# Patient Record
Sex: FEMALE | Race: WHITE | Employment: UNEMPLOYED | URBAN - METROPOLITAN AREA
[De-identification: names, ages, dates, MRNs, and addresses within clinical notes are randomized per-mention and may not be internally consistent; named-entity substitution may affect disease eponyms.]

---

## 2020-11-19 LAB
ANTIBODY SCREEN OB: NEGATIVE
HEPATITIS B SURFACE ANTIGEN OB: NEGATIVE
HIV RESULT OB: NEGATIVE
RAPID PLASMA REAGIN OB: NONREACTIVE
RH FACTOR OB: POSITIVE

## 2021-03-20 ENCOUNTER — HOSPITAL ENCOUNTER (OUTPATIENT)
Facility: HOSPITAL | Age: 36
Setting detail: OBSERVATION
Discharge: HOME | End: 2021-03-20
Attending: OBSTETRICS & GYNECOLOGY | Admitting: OBSTETRICS & GYNECOLOGY
Payer: COMMERCIAL

## 2021-03-20 VITALS — HEART RATE: 77 BPM | SYSTOLIC BLOOD PRESSURE: 120 MMHG | DIASTOLIC BLOOD PRESSURE: 71 MMHG

## 2021-03-20 PROBLEM — Z34.90 PREGNANCY (HCC): Status: ACTIVE | Noted: 2021-03-20

## 2021-03-20 PROBLEM — Z34.90 PREGNANCY: Status: ACTIVE | Noted: 2021-03-20

## 2021-03-20 PROCEDURE — 36415 COLL VENOUS BLD VENIPUNCTURE: CPT

## 2021-03-20 PROCEDURE — 99203 OFFICE O/P NEW LOW 30 MIN: CPT

## 2021-03-20 RX ORDER — PRENATAL VIT/IRON FUM/FOLIC AC 27MG-0.8MG
1 TABLET ORAL DAILY
COMMUNITY
End: 2021-11-17

## 2021-03-20 NOTE — TRIAGE
[facility]      Triage Note    Salima Hall Patient Status:  Observation    1985 MRN [id number]   Location [address] [facility] Attending Mimi Jay MD   Hosp Day # 0 PCP No primary care provider on file. ronit. exam in office cx closed; no vaginal bleeding. Brigitte Vann RN  3/20/2021 1:25 PM    MD Triage Addendum    Dx: Threatened  Labor  NST: Appropriate for EGA.   No UCs  Disp: Praneeth Mckeon MD  3/24/2021 9:38 PM

## 2021-03-20 NOTE — PROGRESS NOTES
Pt is a 43year old female admitted to TR3/TR3-A. Patient presents with:   Assessment: pt. sent from the office for further evaluation. pt. ronit. exam in office cx closed; no vaginal bleeding.      Pt is  24w3d intra-uterine pregna

## 2021-06-08 ENCOUNTER — HOSPITAL ENCOUNTER (INPATIENT)
Facility: HOSPITAL | Age: 36
LOS: 2 days | Discharge: HOME | End: 2021-06-10
Attending: OBSTETRICS & GYNECOLOGY | Admitting: OBSTETRICS & GYNECOLOGY
Payer: COMMERCIAL

## 2021-06-08 PROBLEM — Z34.90 PREGNANT: Status: ACTIVE | Noted: 2021-06-08

## 2021-06-08 PROBLEM — Z34.90 PREGNANT (HCC): Status: ACTIVE | Noted: 2021-06-08

## 2021-06-08 PROCEDURE — 88307 TISSUE EXAM BY PATHOLOGIST: CPT | Performed by: OBSTETRICS & GYNECOLOGY

## 2021-06-08 PROCEDURE — 86780 TREPONEMA PALLIDUM: CPT | Performed by: OBSTETRICS & GYNECOLOGY

## 2021-06-08 PROCEDURE — 85025 COMPLETE CBC W/AUTO DIFF WBC: CPT | Performed by: OBSTETRICS & GYNECOLOGY

## 2021-06-08 PROCEDURE — 86900 BLOOD TYPING SEROLOGIC ABO: CPT | Performed by: OBSTETRICS & GYNECOLOGY

## 2021-06-08 PROCEDURE — 86901 BLOOD TYPING SEROLOGIC RH(D): CPT | Performed by: OBSTETRICS & GYNECOLOGY

## 2021-06-08 PROCEDURE — 86701 HIV-1ANTIBODY: CPT | Performed by: OBSTETRICS & GYNECOLOGY

## 2021-06-08 PROCEDURE — 0KQM0ZZ REPAIR PERINEUM MUSCLE, OPEN APPROACH: ICD-10-PCS | Performed by: OBSTETRICS & GYNECOLOGY

## 2021-06-08 PROCEDURE — 86850 RBC ANTIBODY SCREEN: CPT | Performed by: OBSTETRICS & GYNECOLOGY

## 2021-06-08 RX ORDER — TERBUTALINE SULFATE 1 MG/ML
0.25 INJECTION, SOLUTION SUBCUTANEOUS AS NEEDED
Status: DISCONTINUED | OUTPATIENT
Start: 2021-06-08 | End: 2021-06-08

## 2021-06-08 RX ORDER — SODIUM CHLORIDE, SODIUM LACTATE, POTASSIUM CHLORIDE, CALCIUM CHLORIDE 600; 310; 30; 20 MG/100ML; MG/100ML; MG/100ML; MG/100ML
INJECTION, SOLUTION INTRAVENOUS CONTINUOUS
Status: DISCONTINUED | OUTPATIENT
Start: 2021-06-08 | End: 2021-06-08

## 2021-06-08 RX ORDER — ACETAMINOPHEN 325 MG/1
650 TABLET ORAL EVERY 6 HOURS PRN
Status: DISCONTINUED | OUTPATIENT
Start: 2021-06-08 | End: 2021-06-10

## 2021-06-08 RX ORDER — AMMONIA INHALANTS 0.04 G/.3ML
0.3 INHALANT RESPIRATORY (INHALATION) AS NEEDED
Status: DISCONTINUED | OUTPATIENT
Start: 2021-06-08 | End: 2021-06-08

## 2021-06-08 RX ORDER — TRISODIUM CITRATE DIHYDRATE AND CITRIC ACID MONOHYDRATE 500; 334 MG/5ML; MG/5ML
30 SOLUTION ORAL AS NEEDED
Status: DISCONTINUED | OUTPATIENT
Start: 2021-06-08 | End: 2021-06-08

## 2021-06-08 RX ORDER — DEXTROSE, SODIUM CHLORIDE, SODIUM LACTATE, POTASSIUM CHLORIDE, AND CALCIUM CHLORIDE 5; .6; .31; .03; .02 G/100ML; G/100ML; G/100ML; G/100ML; G/100ML
INJECTION, SOLUTION INTRAVENOUS CONTINUOUS
Status: DISCONTINUED | OUTPATIENT
Start: 2021-06-08 | End: 2021-06-08

## 2021-06-08 RX ORDER — IBUPROFEN 600 MG/1
600 TABLET ORAL EVERY 6 HOURS PRN
Status: DISCONTINUED | OUTPATIENT
Start: 2021-06-08 | End: 2021-06-10

## 2021-06-08 RX ORDER — ONDANSETRON 2 MG/ML
4 INJECTION INTRAMUSCULAR; INTRAVENOUS EVERY 6 HOURS PRN
Status: DISCONTINUED | OUTPATIENT
Start: 2021-06-08 | End: 2021-06-08

## 2021-06-08 RX ORDER — IBUPROFEN 600 MG/1
600 TABLET ORAL EVERY 6 HOURS PRN
Status: DISCONTINUED | OUTPATIENT
Start: 2021-06-08 | End: 2021-06-08

## 2021-06-08 RX ORDER — AMMONIA INHALANTS 0.04 G/.3ML
0.3 INHALANT RESPIRATORY (INHALATION) AS NEEDED
Status: DISCONTINUED | OUTPATIENT
Start: 2021-06-08 | End: 2021-06-10

## 2021-06-08 RX ORDER — DIAPER,BRIEF,INFANT-TODD,DISP
1 EACH MISCELLANEOUS EVERY 6 HOURS PRN
Status: DISCONTINUED | OUTPATIENT
Start: 2021-06-08 | End: 2021-06-10

## 2021-06-08 RX ORDER — LIDOCAINE HYDROCHLORIDE 10 MG/ML
30 INJECTION, SOLUTION EPIDURAL; INFILTRATION; INTRACAUDAL; PERINEURAL ONCE
Status: DISCONTINUED | OUTPATIENT
Start: 2021-06-08 | End: 2021-06-08

## 2021-06-08 RX ORDER — ONDANSETRON 2 MG/ML
4 INJECTION INTRAMUSCULAR; INTRAVENOUS EVERY 6 HOURS PRN
Status: DISCONTINUED | OUTPATIENT
Start: 2021-06-08 | End: 2021-06-10

## 2021-06-08 RX ORDER — ACETAMINOPHEN 500 MG
500 TABLET ORAL EVERY 6 HOURS PRN
Status: DISCONTINUED | OUTPATIENT
Start: 2021-06-08 | End: 2021-06-08

## 2021-06-08 RX ORDER — BISACODYL 10 MG
10 SUPPOSITORY, RECTAL RECTAL ONCE AS NEEDED
Status: DISCONTINUED | OUTPATIENT
Start: 2021-06-08 | End: 2021-06-10

## 2021-06-08 RX ORDER — SIMETHICONE 80 MG
80 TABLET,CHEWABLE ORAL 3 TIMES DAILY PRN
Status: DISCONTINUED | OUTPATIENT
Start: 2021-06-08 | End: 2021-06-10

## 2021-06-08 RX ORDER — DOCUSATE SODIUM 100 MG/1
100 CAPSULE, LIQUID FILLED ORAL 2 TIMES DAILY
Status: DISCONTINUED | OUTPATIENT
Start: 2021-06-08 | End: 2021-06-10

## 2021-06-08 NOTE — L&D DELIVERY NOTE
Netta Jose  [id number]  2021  5:51 AM      Delivery Note    Type of delivery:   Fetus:  · Position: OA  · Sex: Male  · Weight: 8lb 3oz  · Apgars: 8/9  Anesthesia: none  Episiotomy: No  Laceration:   · Location: 2nd degree midline perineal  · Rep

## 2021-06-08 NOTE — PROGRESS NOTES
Report given to [facility]. Patient still visiting  in [facility] at this time.

## 2021-06-08 NOTE — H&P
[facility]  [id number]  2021  5:47 AM    LABOR & DELIVERY H&P    The patient is a 43year old  at 35w6d based on LMP and 7wk U/S presenting with PPROM and labor.     Her pregnancy has been complicated by:  · AMA - CFDNA WNL  · Borderline poly

## 2021-06-08 NOTE — LACTATION NOTE
This note was copied from a baby's chart.   LACTATION NOTE - INFANT    Evaluation Type  Evaluation Type: Inpatient    Problems & Assessment  Problems Diagnosed or Identified: Sleepy  Infant Assessment: Anterior fontanel soft and flat;Skin color: pink or kiran

## 2021-06-08 NOTE — PROGRESS NOTES
Pt is a 43year old female admitted to LDR3/LDR3-A. Patient presents with:  Rupture Of Membranes: SROM 0315. (+) fetal movement, Denies bleeding     Pt is [id number] 35w6d intra-uterine pregnancy. History obtained, consents signed.  Oriented to room, staff,

## 2021-06-08 NOTE — DISCHARGE SUMMARY
[facility]    Discharge Summary    Dora Hayes Patient Status:  Inpatient    1985 MRN [id number]   Location [address]  Attending Page Rosario MD   [facility] Day # 0       Admission Date: 2021  Disch

## 2021-06-09 PROCEDURE — 85014 HEMATOCRIT: CPT | Performed by: OBSTETRICS & GYNECOLOGY

## 2021-06-09 PROCEDURE — 85018 HEMOGLOBIN: CPT | Performed by: OBSTETRICS & GYNECOLOGY

## 2021-06-09 NOTE — LACTATION NOTE
This note was copied from a baby's chart.   LACTATION NOTE - INFANT    Evaluation Type  Evaluation Type: Inpatient    Problems & Assessment  Problems Diagnosed or Identified: Premature  Problems: comment/detail: 35 weeks  Infant Assessment: Hunger cues pres

## 2021-06-09 NOTE — LACTATION NOTE
This note was copied from a baby's chart.   LACTATION NOTE - INFANT    Evaluation Type  Evaluation Type: Inpatient    Problems & Assessment  Problems Diagnosed or Identified: Premature  Problems: comment/detail: 35 weeks  Infant Assessment: Anterior fontane

## 2021-06-09 NOTE — PLAN OF CARE
Problem: BIRTH - VAGINAL/ SECTION  Goal: Fetal and maternal status remain reassuring during the birth process  Description: INTERVENTIONS:  - Monitor vital signs  - Monitor fetal heart rate  - Monitor uterine activity  - Monitor labor progression ambulation and provide assistance as needed. - Assess and monitor emotional status and provide social service/psych resources as needed. - Utilize standard precautions and use personal protective equipment as indicated.  Ensure aseptic care of all intrave supply with medication/procedure interruptions  Description: INTERVENTIONS:  - Review techniques for milk expression (breast pumping). - Provide pumping equipment/supplies, instructions, and assistance until it is safe to breastfeed infant.   Outcome: Pro

## 2021-06-09 NOTE — PROGRESS NOTES
Post-Partum Note   6/9/2021, 10:43 AM    Subjective:  PPD #1  No complaints. Lochia normal. Voiding normal. Not dizzy. Mood good.        Objective:   06/08/21  1300 06/08/21  1700 06/08/21 2058 06/09/21  0800   BP: 114/64 115/68 115/63 113/66   BP Location

## 2021-06-09 NOTE — LACTATION NOTE
LACTATION NOTE - MOTHER      Evaluation Type: Inpatient    Problems identified  Problems identified: Knowledge deficit    Maternal history  Maternal history: AMA  Other/comment:  labor    Breastfeeding goal  Breastfeeding goal: To maintain breast mi

## 2021-06-10 VITALS
BODY MASS INDEX: 29.08 KG/M2 | RESPIRATION RATE: 16 BRPM | DIASTOLIC BLOOD PRESSURE: 74 MMHG | SYSTOLIC BLOOD PRESSURE: 117 MMHG | HEART RATE: 86 BPM | WEIGHT: 158 LBS | TEMPERATURE: 98 F | OXYGEN SATURATION: 99 % | HEIGHT: 62 IN

## 2021-06-10 PROBLEM — Z34.90 PREGNANCY: Status: RESOLVED | Noted: 2021-03-20 | Resolved: 2021-06-10

## 2021-06-10 PROBLEM — Z34.90 PREGNANCY (HCC): Status: RESOLVED | Noted: 2021-03-20 | Resolved: 2021-06-10

## 2021-06-10 PROBLEM — N83.292 COMPLEX CYST OF LEFT OVARY: Status: ACTIVE | Noted: 2021-06-10

## 2021-06-10 PROBLEM — Z34.90 PREGNANT (HCC): Status: RESOLVED | Noted: 2021-06-08 | Resolved: 2021-06-10

## 2021-06-10 PROBLEM — Z86.32 HISTORY OF GESTATIONAL DIABETES: Chronic | Status: ACTIVE | Noted: 2021-06-10

## 2021-06-10 PROBLEM — Z34.90 PREGNANT: Status: RESOLVED | Noted: 2021-06-08 | Resolved: 2021-06-10

## 2021-06-10 NOTE — PROGRESS NOTES
Discharge order received from MD.    Discharge instructions and medications reviewed with patient. Follow up instructions with OB given. Mother verbalizes understanding of instructions. Discharged in stable condition to [facility].

## 2021-06-17 ENCOUNTER — TELEPHONE (OUTPATIENT)
Dept: OBGYN UNIT | Facility: HOSPITAL | Age: 36
End: 2021-06-17

## 2021-11-15 NOTE — H&P
[facility]    PATIENT'S NAME: Ernesto Kiki   ATTENDING PHYSICIAN: Chip Barrios MD   PATIENT ACCOUNT#:   [de-identified]    LOCATION:    MEDICAL RECORD #:   [id number]       YOB: 1985  ADMISSION DATE:       11/19/2021    HISTORY G4, P3, with a left ovarian dermoid cyst, who presents today for a laparoscopic left ovarian cystectomy. She has been counseled as to the risks, benefits, and alternatives to the procedure, and desires to proceed today.      Dictated By Harry Soulier

## 2021-11-16 ENCOUNTER — LAB ENCOUNTER (OUTPATIENT)
Dept: LAB | Facility: HOSPITAL | Age: 36
End: 2021-11-16
Attending: OBSTETRICS & GYNECOLOGY
Payer: COMMERCIAL

## 2021-11-16 DIAGNOSIS — Z01.818 PREOP TESTING: ICD-10-CM

## 2021-11-19 ENCOUNTER — ANESTHESIA EVENT (OUTPATIENT)
Dept: SURGERY | Facility: HOSPITAL | Age: 36
End: 2021-11-19
Payer: COMMERCIAL

## 2021-11-19 ENCOUNTER — ANESTHESIA (OUTPATIENT)
Dept: SURGERY | Facility: HOSPITAL | Age: 36
End: 2021-11-19
Payer: COMMERCIAL

## 2021-11-19 ENCOUNTER — HOSPITAL ENCOUNTER (OUTPATIENT)
Facility: HOSPITAL | Age: 36
Setting detail: HOSPITAL OUTPATIENT SURGERY
Discharge: HOME | End: 2021-11-19
Attending: OBSTETRICS & GYNECOLOGY | Admitting: OBSTETRICS & GYNECOLOGY
Payer: COMMERCIAL

## 2021-11-19 VITALS
HEIGHT: 62 IN | SYSTOLIC BLOOD PRESSURE: 101 MMHG | WEIGHT: 128 LBS | HEART RATE: 68 BPM | BODY MASS INDEX: 23.55 KG/M2 | OXYGEN SATURATION: 99 % | RESPIRATION RATE: 14 BRPM | DIASTOLIC BLOOD PRESSURE: 46 MMHG | TEMPERATURE: 97 F

## 2021-11-19 DIAGNOSIS — Z01.818 PREOP TESTING: Primary | ICD-10-CM

## 2021-11-19 PROBLEM — Z86.32 HISTORY OF GESTATIONAL DIABETES: Chronic | Status: RESOLVED | Noted: 2021-06-10 | Resolved: 2021-11-19

## 2021-11-19 PROCEDURE — 88307 TISSUE EXAM BY PATHOLOGIST: CPT | Performed by: OBSTETRICS & GYNECOLOGY

## 2021-11-19 PROCEDURE — 88305 TISSUE EXAM BY PATHOLOGIST: CPT | Performed by: OBSTETRICS & GYNECOLOGY

## 2021-11-19 PROCEDURE — 81025 URINE PREGNANCY TEST: CPT

## 2021-11-19 PROCEDURE — 0UB14ZZ EXCISION OF LEFT OVARY, PERCUTANEOUS ENDOSCOPIC APPROACH: ICD-10-PCS | Performed by: OBSTETRICS & GYNECOLOGY

## 2021-11-19 RX ORDER — HYDROCODONE BITARTRATE AND ACETAMINOPHEN 5; 325 MG/1; MG/1
2 TABLET ORAL AS NEEDED
Status: DISCONTINUED | OUTPATIENT
Start: 2021-11-19 | End: 2021-11-19

## 2021-11-19 RX ORDER — DIPHENHYDRAMINE HYDROCHLORIDE 50 MG/ML
12.5 INJECTION INTRAMUSCULAR; INTRAVENOUS EVERY 4 HOURS PRN
Status: DISCONTINUED | OUTPATIENT
Start: 2021-11-19 | End: 2021-11-19

## 2021-11-19 RX ORDER — PROCHLORPERAZINE EDISYLATE 5 MG/ML
5 INJECTION INTRAMUSCULAR; INTRAVENOUS ONCE AS NEEDED
Status: DISCONTINUED | OUTPATIENT
Start: 2021-11-19 | End: 2021-11-19

## 2021-11-19 RX ORDER — IBUPROFEN 400 MG/1
400 TABLET ORAL EVERY 6 HOURS PRN
Status: DISCONTINUED | OUTPATIENT
Start: 2021-11-19 | End: 2021-11-19

## 2021-11-19 RX ORDER — ONDANSETRON 2 MG/ML
4 INJECTION INTRAMUSCULAR; INTRAVENOUS EVERY 8 HOURS PRN
Status: DISCONTINUED | OUTPATIENT
Start: 2021-11-19 | End: 2021-11-19

## 2021-11-19 RX ORDER — ROCURONIUM BROMIDE 10 MG/ML
INJECTION, SOLUTION INTRAVENOUS AS NEEDED
Status: DISCONTINUED | OUTPATIENT
Start: 2021-11-19 | End: 2021-11-19 | Stop reason: SURG

## 2021-11-19 RX ORDER — IBUPROFEN 600 MG/1
600 TABLET ORAL EVERY 6 HOURS PRN
Status: DISCONTINUED | OUTPATIENT
Start: 2021-11-19 | End: 2021-11-19

## 2021-11-19 RX ORDER — NALOXONE HYDROCHLORIDE 0.4 MG/ML
80 INJECTION, SOLUTION INTRAMUSCULAR; INTRAVENOUS; SUBCUTANEOUS AS NEEDED
Status: DISCONTINUED | OUTPATIENT
Start: 2021-11-19 | End: 2021-11-19

## 2021-11-19 RX ORDER — SODIUM CHLORIDE, SODIUM LACTATE, POTASSIUM CHLORIDE, CALCIUM CHLORIDE 600; 310; 30; 20 MG/100ML; MG/100ML; MG/100ML; MG/100ML
INJECTION, SOLUTION INTRAVENOUS CONTINUOUS
Status: DISCONTINUED | OUTPATIENT
Start: 2021-11-19 | End: 2021-11-19

## 2021-11-19 RX ORDER — ONDANSETRON 2 MG/ML
4 INJECTION INTRAMUSCULAR; INTRAVENOUS ONCE AS NEEDED
Status: DISCONTINUED | OUTPATIENT
Start: 2021-11-19 | End: 2021-11-19

## 2021-11-19 RX ORDER — HYDROCODONE BITARTRATE AND ACETAMINOPHEN 5; 325 MG/1; MG/1
1 TABLET ORAL AS NEEDED
Status: DISCONTINUED | OUTPATIENT
Start: 2021-11-19 | End: 2021-11-19

## 2021-11-19 RX ORDER — ACETAMINOPHEN 500 MG
1000 TABLET ORAL ONCE
Status: COMPLETED | OUTPATIENT
Start: 2021-11-19 | End: 2021-11-19

## 2021-11-19 RX ORDER — BUPIVACAINE HYDROCHLORIDE 2.5 MG/ML
INJECTION, SOLUTION EPIDURAL; INFILTRATION; INTRACAUDAL AS NEEDED
Status: DISCONTINUED | OUTPATIENT
Start: 2021-11-19 | End: 2021-11-19 | Stop reason: HOSPADM

## 2021-11-19 RX ORDER — GLYCOPYRROLATE 0.2 MG/ML
INJECTION, SOLUTION INTRAMUSCULAR; INTRAVENOUS AS NEEDED
Status: DISCONTINUED | OUTPATIENT
Start: 2021-11-19 | End: 2021-11-19 | Stop reason: SURG

## 2021-11-19 RX ORDER — DEXAMETHASONE SODIUM PHOSPHATE 4 MG/ML
VIAL (ML) INJECTION AS NEEDED
Status: DISCONTINUED | OUTPATIENT
Start: 2021-11-19 | End: 2021-11-19 | Stop reason: SURG

## 2021-11-19 RX ORDER — MIDAZOLAM HYDROCHLORIDE 1 MG/ML
INJECTION INTRAMUSCULAR; INTRAVENOUS AS NEEDED
Status: DISCONTINUED | OUTPATIENT
Start: 2021-11-19 | End: 2021-11-19 | Stop reason: SURG

## 2021-11-19 RX ORDER — ONDANSETRON 4 MG/1
4 TABLET, FILM COATED ORAL EVERY 8 HOURS PRN
Status: DISCONTINUED | OUTPATIENT
Start: 2021-11-19 | End: 2021-11-19

## 2021-11-19 RX ORDER — IBUPROFEN 200 MG
200 TABLET ORAL EVERY 6 HOURS PRN
Status: DISCONTINUED | OUTPATIENT
Start: 2021-11-19 | End: 2021-11-19

## 2021-11-19 RX ADMIN — MIDAZOLAM HYDROCHLORIDE 2 MG: 1 INJECTION INTRAMUSCULAR; INTRAVENOUS at 07:29:00

## 2021-11-19 RX ADMIN — ROCURONIUM BROMIDE 10 MG: 10 INJECTION, SOLUTION INTRAVENOUS at 07:34:00

## 2021-11-19 RX ADMIN — GLYCOPYRROLATE 0.2 MG: 0.2 INJECTION, SOLUTION INTRAMUSCULAR; INTRAVENOUS at 08:12:00

## 2021-11-19 RX ADMIN — DEXAMETHASONE SODIUM PHOSPHATE 4 MG: 4 MG/ML VIAL (ML) INJECTION at 07:37:00

## 2021-11-19 NOTE — DISCHARGE SUMMARY
[facility] HOSP - [facility]    Discharge Summary    Jose Rebolledo Patient Status:  Hospital Outpatient Surgery    1985 MRN [id number]   Location [place] [address] Attending Doron Luevano MD   Hosp Day # 0 PCP No primary care prov

## 2021-11-19 NOTE — ANESTHESIA PREPROCEDURE EVALUATION
Anesthesia PreOp Note    HPI:     Karina Aguila is a 43year old female who presents for preoperative consultation requested by: Jennifer Reaves MD    Date of Surgery: 11/19/2021    Procedure(s):  laparoscopic left ovarian cystectomy, possible left sal Health  Financial Resource Strain: Not on file  Food Insecurity: Not on file  Transportation Needs: Not on file  Physical Activity: Not on file  Stress: Not on file  Social Connections: Not on file  Intimate Partner Violence: Not on file  Housing Stability

## 2021-11-19 NOTE — BRIEF OP NOTE
Pre-Operative Diagnosis: left ovarian dermoid     Post-Operative Diagnosis: left ovarian dermoid      Procedure Performed:   laparoscopic left ovarian cystectomy    Surgeon(s) and Role:     * Erik Correa MD - Primary     * Dianelys Salvador MD - Jana Ledesma no

## 2021-11-19 NOTE — INTERVAL H&P NOTE
Pre-op Diagnosis: left ovarian dermoid    The above referenced H&P was reviewed by Nasima Hubbard MD on 11/19/2021, the patient was examined and no significant changes have occurred in the patient's condition since the H&P was performed.   I discussed w

## 2021-11-19 NOTE — ANESTHESIA POSTPROCEDURE EVALUATION
Patient: Little Gutierrez    Procedure Summary     Date: 11/19/21 Room / Location: [address] [place] [address] MAIN OR 03 / [address] [facility] MAIN OR    Anesthesia Start: 3577 Anesthesia Stop: 7160    Procedure: laparoscopic left ovarian cystectomy (Left Abdomen) Diagnosis: (left ovarian dermoid)

## 2021-11-19 NOTE — INTERVAL H&P NOTE
Pre-op Diagnosis: left ovarian dermoid    The above referenced H&P was reviewed by Yanni Theodore MD on 11/19/2021, the patient was examined and no significant changes have occurred in the patient's condition since the H&P was performed.   I discussed w

## 2021-11-19 NOTE — ANESTHESIA PROCEDURE NOTES
Airway  Date/Time: 11/19/2021 7:34 AM  Urgency: Elective    Airway not difficult    General Information and Staff    Patient location during procedure: OR  Anesthesiologist: Ramakrishna Hernandez MD  Performed: anesthesiologist     Indications and Patient Conditi

## 2021-11-22 NOTE — OPERATIVE REPORT
[facility]    PATIENT'S NAME: Ernesto Kiki   ATTENDING PHYSICIAN: Chip Barrios MD   OPERATING PHYSICIAN: Chip Barrios MD   PATIENT ACCOUNT#:   [de-identified]    LOCATION:  [place] [address]  MEDICAL RECORD #:   [id number] with the use of various blunt dissectors. The 10 mm scope was then swapped for a 5 mm, such that an EndoCatch bag could be inserted and the ovarian cyst removed that way.   Replacement of the 10 mm scope after removal of the cyst revealed good hemostasis i

## 2025-04-15 ENCOUNTER — OFFICE VISIT (OUTPATIENT)
Dept: OBGYN CLINIC | Facility: CLINIC | Age: 40
End: 2025-04-15
Payer: COMMERCIAL

## 2025-04-15 VITALS
WEIGHT: 126.63 LBS | BODY MASS INDEX: 23.3 KG/M2 | DIASTOLIC BLOOD PRESSURE: 74 MMHG | HEIGHT: 62 IN | SYSTOLIC BLOOD PRESSURE: 122 MMHG

## 2025-04-15 DIAGNOSIS — Z12.4 SCREENING FOR CERVICAL CANCER: ICD-10-CM

## 2025-04-15 DIAGNOSIS — Z01.419 ENCOUNTER FOR ANNUAL ROUTINE GYNECOLOGICAL EXAMINATION: Primary | ICD-10-CM

## 2025-04-15 PROCEDURE — 3078F DIAST BP <80 MM HG: CPT | Performed by: OBSTETRICS & GYNECOLOGY

## 2025-04-15 PROCEDURE — 99386 PREV VISIT NEW AGE 40-64: CPT | Performed by: OBSTETRICS & GYNECOLOGY

## 2025-04-15 PROCEDURE — 87624 HPV HI-RISK TYP POOLED RSLT: CPT | Performed by: OBSTETRICS & GYNECOLOGY

## 2025-04-15 PROCEDURE — 3074F SYST BP LT 130 MM HG: CPT | Performed by: OBSTETRICS & GYNECOLOGY

## 2025-04-15 PROCEDURE — 3008F BODY MASS INDEX DOCD: CPT | Performed by: OBSTETRICS & GYNECOLOGY

## 2025-04-15 NOTE — PROGRESS NOTES
ANNUAL GYN EXAM  [facility]    CHIEF COMPLAINT:    Chief Complaint   Patient presents with    Annual      HISTORY OF PRESENT ILLNESS:   Amla Waterman is a 40 year old female   who presents for annual well woman visit.  She is feeling well without complaints.      Has had a couple days of spotting before menses starts, on/off in last 6 months.   with vasectomy     PAST GYNECOLOGICAL HISTORY & OTHER PREVENTIVE MEDICINE  LMP: Patient's last menstrual period was 2025.  Menarche: 10 years old (4/15/2025  9:32 AM)  Period Cycle (Days): monthly (4/15/2025  9:32 AM)  Period Duration (Days): 5 days (4/15/2025  9:32 AM)  Period Flow: heavy (4/15/2025  9:32 AM)  Use of Birth Control (if yes, specify type): None (4/15/2025  9:32 AM)  Pap Date: 20 (4/15/2025  9:32 AM)  Pap Result Notes: neg (4/15/2025  9:32 AM)    Complications: per HPI  Gravita/Parity:   Contraception: current -vasectomy; Previous - oral contraceptive pill  Sexually transmitted disease history:None  Number of sexual partners: current sexual partners: 1, since age 20, Lifetime partners:   Pap history:  Last pap/result: NIL ; history abnormals: denies  Date of last mammogram: none yet -; history abnormals   Abuse history: denies  Vaginal discharge: denies  Bladder symptoms: denies    PAST MEDICAL HISTORY:   Past Medical History[1]     PAST SURGICAL HISTORY:   Past Surgical History[2]     PAST OB HISTORY:  OB History    Para Term  AB Living   4 3 2 1 1 3   SAB IAB Ectopic Multiple Live Births   1   0 3      # Outcome Date GA Lbr Wes/2nd Weight Sex Type Anes PTL Lv   4  21 35w6d 02:05 / 00:11 8 lb 3 oz (3.715 kg) M VAGINAL DENY None Y JENELLE      Complications: Other - see comments, Precipitous labor,  labor   3 Term 18 39w5d  8 lb 14 oz (4.026 kg) F NORMAL SPONT   JENELLE   2 Term 14 39w0d  8 lb 7 oz (3.827 kg) M Caesarean  N JENELLE   1 SAB 12    U    FD       CURRENT MEDICATIONS:     Medications - Current[3]    ALLERGIES:  Allergies[4]    SOCIAL HISTORY:  Social Hx on file[5]    FAMILY HISTORY:  Family History[6]  ASSESSMENTS:  REVIEW OF SYSTEMS:  CONSTITUTIONAL:  negative for fevers, chills and sweats    EYES:  negative for  blurred vision and visual disturbance  RESPIRATORY:  negative for  cough and shortness of breath  CARDIOVASCULAR:  negative for  chest pain, palpitations  GASTROINTESTINAL:  No constipation/diarrhea, no pain  GENITOURINARY:  See History of Present Illness  INTEGUMENT/BREAST: Breast: no masses, no nipple discharge  ENDOCRINE:  negative for acne, constipation, diarrhea, cold intolerance, heat intolerance, fatigue, hair loss, weight gain and weight loss  MUSCULOSKELETAL:  negative for joint pain  NEUROLOGICAL:  negative for dizziness/lightheadedness and headaches  BEHAVIOR/PSYCH:  Negative for depressed mood, anhedonia and anxiety    PHYSICAL EXAM  Patient's last menstrual period was 04/04/2025.   Vitals:    04/15/25 0932   BP: 122/74   Weight: 126 lb 9.6 oz (57.4 kg)   Height: 62\"       CONSTITUTIONAL: Awake, alert, cooperative, no apparent distress, and appears stated age   NECK:  symmetrical, trachea midline, no adenopathy, thyroid symmetric, not enlarged   LUNGS: respiration unlabored  CARDIOVASCULAR: no peripheral edema or varicosities, skin warm and dry  ABDOMEN: Soft, non-distended, non-tender, no masses palpated    CHEST/BREASTS: Breasts symmetrical, skin without lesion(s), no nipple retraction or dimpling, no nipple discharge, no masses palpated, no axillary or supraclavicular adenopathy  GENITAL/URINARY:    External Genitalia:  General appearance; normal, Hair distribution; normal, Lesions absent   Urethral Meatus:  Lesions absent, Prolapse absent  Bladder:  Tenderness absent, Cystocele absent  Vagina:  Discharge absent, Lesions absent, Pelvic support normal  Cervix:  Lesions absent, Discharge absent, Tenderness absent  Uterus:  Size normal, Masses absent,  Tenderness absent  Adnexa:  Masses absent, Tenderness absent  Anus/Perineum:  Lesions absent    MUSCULOSKELETAL: There is no redness, warmth, or swelling of the joints.  Full range of motion noted.  Motor strength is 5 out of 5 all extremities bilaterally.  Tone is normal.  NEUROLOGIC: Patient is awake, alert and oriented to name, place and time.  Casual gait is normal.  SKIN: no bruising or bleeding and no rashes  PSYCHIATRIC: Behavior:  Appropriate  Mood:  appropriate  ASSESSMENT AND PLAN:  1. Encounter for annual routine gynecological examination  Observe menses for now. If worsening, consider ultrasound, endometrial biopsy.    - [facility] 2D+3D SCREENING BILAT (CPT=77067/78944); Future    2. Screening for cervical cancer    - ThinPrep PAP Smear; Future  - Hpv Dna  High Risk , Thin Prep Collect; Future  - ThinPrep PAP Smear  - Hpv Dna  High Risk , Thin Prep Collect       Preventive Medicine in a 40 year old female  Health Maintenance Topics with due status: Overdue       Topic Date Due    Annual Physical Never done    Mammogram Never done    DTaP,Tdap,and Td Vaccines Never done    Pap Smear Never done    COVID-19 Vaccine 2024    Annual Depression Screening Never done       COUNSELING/EDUCATION PERFORMED:   Contraception.  Form chosen:  vasectomy  method use review  emergency contraception  Cervical Cancer Screening  Breast Cancer Screening - monthly self breast exam   Appropriate diet  Exercise  Safe sex/STD transmission/use of condoms  follow up 1 yr or as needed  Dasha Fields MD         [1] History reviewed. No pertinent past medical history.  [2]   Past Surgical History:  Procedure Laterality Date          Cyst removal      Dilation/curettage,diagnostic     [3] No current outpatient medications on file.  [4] No Known Allergies  [5]   Social History  Socioeconomic History    Marital status:    Tobacco Use    Smoking status: Never    Smokeless tobacco: Never   Vaping Use    Vaping  status: Never Used   Substance and Sexual Activity    Alcohol use: Yes    Drug use: Never    Sexual activity: Yes     Birth control/protection: Vasectomy   Other Topics Concern    Blood Transfusions No   [6]   Family History  Problem Relation Age of Onset    Dementia Father     Lipids Mother     Breast Cancer Neg     Uterine Cancer Neg     Ovarian Cancer Neg

## 2025-04-16 LAB — HPV E6+E7 MRNA CVX QL NAA+PROBE: NEGATIVE

## 2025-04-21 LAB
LAST PAP RESULT: NORMAL
PAP HISTORY (OTHER THAN LAST PAP): NORMAL

## 2025-05-02 ENCOUNTER — HOSPITAL ENCOUNTER (OUTPATIENT)
Dept: MAMMOGRAPHY | Age: 40
Discharge: HOME | End: 2025-05-02
Attending: OBSTETRICS & GYNECOLOGY
Payer: COMMERCIAL

## 2025-05-02 DIAGNOSIS — Z01.419 ENCOUNTER FOR ANNUAL ROUTINE GYNECOLOGICAL EXAMINATION: ICD-10-CM

## 2025-05-02 PROCEDURE — 77063 BREAST TOMOSYNTHESIS BI: CPT | Performed by: OBSTETRICS & GYNECOLOGY

## 2025-05-02 PROCEDURE — 77067 SCR MAMMO BI INCL CAD: CPT | Performed by: OBSTETRICS & GYNECOLOGY

## 2025-06-02 ENCOUNTER — OFFICE VISIT (OUTPATIENT)
Dept: DERMATOLOGY CLINIC | Facility: CLINIC | Age: 40
End: 2025-06-02
Payer: COMMERCIAL

## 2025-06-02 DIAGNOSIS — L81.4 LENTIGO: ICD-10-CM

## 2025-06-02 DIAGNOSIS — D22.9 MULTIPLE NEVI: Primary | ICD-10-CM

## 2025-06-02 DIAGNOSIS — Z12.83 SKIN CANCER SCREENING: ICD-10-CM

## 2025-06-02 DIAGNOSIS — D23.9 BENIGN NEOPLASM OF SKIN, UNSPECIFIED LOCATION: ICD-10-CM

## 2025-06-02 PROCEDURE — 99203 OFFICE O/P NEW LOW 30 MIN: CPT | Performed by: DERMATOLOGY

## 2025-06-02 NOTE — PROGRESS NOTES
The following individual(s) verbally consented to be recorded using ambient AI listening technology and understand that they can each withdraw their consent to this listening technology at any point by asking the clinician to turn off or pause the recording:    Patient name: Alma Waterman  Additional names:

## 2025-06-08 NOTE — PATIENT INSTRUCTIONS
Sun Protection  Wear protective clothing. This includes wearing a broad rimmed hat, long sleeves, high collars, and tightly woven clothes. Specific clothing lines are available for people who need more complete sun avoidance. Use a broad spectrum sunblock (SPF 30 or higher when above options are not possible, or for areas not covered by clothing.    -Avoid being outside when the ultraviolet B (burning) wavelengths from the sun are most intense (10 AM to 3 PM) during non-winter seasons. [place] is year round.    -Ultraviolet A wavelengths from the sun are present year round from linda to dusk. They pass through window glass. This type of ultraviolet is the form seen in tanning beds. It will accentuate sunburns and will produce most of the sunlight -induced aging changes in our skin because it penetrates much deeper into the skin than do the ultraviolet B wavelengths. It is also associated with an increased chance of developing melanoma, potentially the most serious type of skin cancer.    -Broad spectrum sunblocks:  Mexoryl is a sunblock ingredient that is superior to other UVA protection agents. It is found in [employer] (available at [employer] or [employer]). Sunblocks containing microfine zinc oxide (Z-elfego) or avobenzone (Parsol 1789) provide good UVA protection too.  Any sunscreen with the American Academy of Dermat[employer] Seal is a recommended sunscreen.     -Use SPF 30 or greater an apply liberally. It takes 2 tablespoons or one shot glass to cover the average man in a swim suit. The SPF number provides information about the relative amount of protection provided by a product. Because most people use sunblock sparingly, they may get only 1/2 to 1/3 the SPF coverage indicated on the label. SPF may be sufficient in theory, but in real life it is best to use a higher sunblock, hats, SPF clothing and swimwear, and shade.    -Application technique: Apply sunscreens and sunblocks 20-30 minutes before start off sun  exposure and reapply 20 minutes after exposure begins. Studies suggest this provides improved protection. Reapply sunscreens and sunblocks after 2 hours of use or after 80 minutes if swimming or sweating. Sunscreen effectiveness diminishes after 2 hours unless properly reapplied.     Checking for Skin Cancer  You can find cancer early by checking your skin each month. There are 3 common kinds of skin cancer. They are melanoma, basal cell carcinoma, and squamous cell carcinoma. Doing monthly skin checks is the best way to find new marks or skin changes. Follow the instructions below for checking your skin.  The ABCDEs of checking moles for melanoma  Check your moles or growths for signs of melanoma using ABCDE:  Asymmetry: the sides of the mole or growth don’t match  Border: the edges are ragged, notched, or blurred  Color: the color within the mole or growth varies  Diameter: the mole or growth is larger than 6 mm (size of a pencil eraser)  Evolving: the size, shape, or color of the mole or growth is changing    Checking for other types of skin cancer  Basal cell carcinoma or squamous cell carcinoma have symptoms such as:  A spot or mole that looks different from all other marks on your skin  Changes in how an area feels, such as itching, tenderness, or pain  Changes in the skin's surface, such as oozing, bleeding, or scaliness  A sore that does not heal  New swelling or redness beyond the border of a mole  Who’s at risk?  Anyone can get skin cancer. But you are at greater risk if you have:  Fair skin, light-colored hair, or light-colored eyes  Many moles or abnormal moles on your skin  A history of sunburns from sunlight or tanning beds  A family history of skin cancer  A history of exposure to radiation or chemicals  A weakened immune system  If you have had skin cancer in the past, you are at risk for recurring skin cancer.  How to check your skin  Do your monthly skin checkups in front of a full-length mirror.  Check all parts of your body, including your:  Head (ears, face, neck, and scalp)  Torso (front, back, and sides)  Arms (tops, undersides, upper, and lower armpits)  Hands (palms, backs, and fingers, including under the nails)  Buttocks and genitals  Legs (front, back, and sides)  Feet (tops, soles, toes, including under the nails, and between toes)  If you have a lot of moles, take digital photos of them each month. Make sure to take photos both up close and from a distance. These can help you see if any moles change over time.  Most skin changes are not cancer. But if you see any changes in your skin, call your doctor right away. Only he or she can diagnose a problem. If you have skin cancer, seeing your doctor can be the first step toward getting the treatment that could save your life.  [employer] last reviewed this educational content on 4/1/2019 © 2000-2021 [employer]. All rights reserved. This information is not intended as a substitute for professional medical care. Always follow your healthcare professional's instructions.    Preventing Skin Cancer     Use sunscreen of SPF 30 or greater. Apply liberally.   Relaxing in the sun may feel good. But it isn’t good for your skin. In fact, the sun’s harmful rays are the major cause of skin cancer. This is a serious disease that can be life-threatening. People of all ages, races, and backgrounds are at risk.  Skin cancer is the most common cancer in the [place] But in most cases, it can be prevented.  Your role in prevention  You can act today to help prevent skin cancer. Start by avoiding the sun’s UV (ultraviolet) rays. And don’t use tanning beds or lamps. They are no safer than the sun. Taking these steps can help keep you from getting skin cancer. It can also help prevent wrinkles and other aging effects caused by the sun. Make sure your children also follow these safeguards. Now is the time to start taking steps to prevent skin cancer.  When you are  outdoors  Protect your skin when you go out during the day. Take safety steps whenever you go out to eat, run errands by car or on foot, or do any outdoor activity. There isn’t just one easy way to protect your skin. It’s best to follow all of these steps:  Wear tightly woven clothing that covers your skin. Put on a wide-brimmed hat to protect your face, ears, and scalp.  Watch the clock. Try to stay out of the sun between 10 a.m. and 4 p.m. That's when the sun's rays are strongest.  Head for the shade or create your own. Use an umbrella when sitting or strolling.  Know that the sun’s rays can reflect off sand, water, and snow. This can harm your skin. Take extra care when you are near reflective surfaces.  Keep in mind that even when the weather is hazy or cloudy, your skin can be exposed to strong UV rays.  Shield your skin with sunscreen. Also use sunscreen on your children’s skin. Keep babies younger than 6 months old out of the sun.  Tips for using sunscreen  To help prevent skin cancer, choose the right sunscreen and use it correctly. Try these tips:  Choose a sunscreen that has an SPF (sun protection factor) of at least 30. Also choose a sunscreen labeled \"broad spectrum.” This will protect you from both UVA and UVB (ultraviolet A and B) rays.  If one brand irritates your skin, try another, such as one without fragrance.  Use a water-resistant sunscreen if you swim or sweat.  Use at least 1 ounce of sunscreen to cover exposed areas. This is enough to fill a shot glass. You might need to adjust the amount depending on your body size.  Put the sunscreen on dry skin about 15 minutes before going outdoors. This gives it time to soak in.  Reapply sunscreen every 2 hours. If you’re active, do this more often.  Cover any sun-exposed skin, from your face to your feet. Don’t forget your scalp, ears, and lips.  Know that while sunscreen helps protect you, it isn’t enough. Sunscreens extend the length of time you can be  outdoors before your skin starts to get red. But they don't give you total protection. Using sunscreen doesn't mean you can stay out in the sun for an unlimited time. Your skin cells are still being damaged. You should also wear protective clothing. And try to stay out of the sun as much as you can, especially from 10 a.m. to 4 p.m.  [employer] last reviewed this educational content on 7/1/2019 © 2000-2021 [employer]. All rights reserved. This information is not intended as a substitute for professional medical care. Always follow your healthcare professional's instructions.    Common Types of Skin Cancer  Skin cancer is a serious disease that can affect anyone at any age. It's the most common kind of cancer in the [place] If found early, when it's small and hasn't spread, skin cancer can often be treated with success. But in some cases, it's life-threatening.  Talk with your healthcare provider about what you can do to help prevent skin cancer. Ask about regular skin exams as part of your routine physicals. You can also ask about doing monthly self-exams of your skin. If you see any changes in your skin, call your healthcare provider right away.   Understanding the skin  The skin is made up of 3 layers: epidermis, dermis, and hypodermis or fat layer. The epidermis is the thin outer layer of the skin. It consists of 3 types of cells: squamous cells, basal cells, and melanocytes. The squamous cells are flat cells in the outermost layer and are continuously shed. The basal cells are round cells found just beneath the squamous cells at the base of the epidermis. The melanocytes are found in every layer of the epidermis and make melanin. This give the skin its color. The dermis layer is the middle layer of skin and consists of blood and lymph vessels, hair follicles, oil and sweat glands, nerves and collagen. This layer give skin flexibility and strength. The fat layer is the deepest layer consisting of fat  and collagen. It helps provide the body's heat and protects the body from injury.  Skin cancer is a type of cancer that grows in the epidermal layer of the skin. It can form in the basal cells, squamous cells, or melanocytes. Skin cancer can be grouped into 2 types: non-melanoma and melanoma. The 2 most common types of non-melanoma skin cancer are basal cell carcinoma and squamous cell carcinoma.  Basal cell cancer  Basal cell cancer is the most common skin cancer. It starts in basal cells in the deepest part of the epidermis. It's usually found on the face, ears, neck, trunk, or arms, but it can start anywhere. Varying in color, these lesions may be waxy, pearly, scaly, or scar-like. Tiny blood vessels can sometimes be seen through the lesion’s surface. These lesions can bleed easily and might not heal well. Nearly all basal cell cancers can be treated and cured.  Squamous cell cancer  Squamous cell cancer is another type of skin cancer. It starts in flat cells called squamous cells in the upper part of the epidermis. Lesions often form on the face, ears, neck, hands, or arms. They can start in scars and sores that don't heal. The lesions are firm, red bumps or flat, scaly, crusty growths. Squamous cell carcinoma is more likely to grow and spread to other parts of the body than basal cell carcinoma, although this is still uncommon. Most squamous cell carcinoma is found early enough to be treated and cured.  Melanoma  Melanoma is a less common, but much more dangerous kind of skin cancer. It starts in skin cells called melanocytes. It's more likely to grow and spread than basal or squamous cell cancers. It's often not easy to tell where a melanoma lesion’s borders are. It's often brown or black, but it may be mixed in color. The shape and size of melanoma lesions tend to differ from one side to the other. Melanoma can start anywhere, like the genitals, mouth, palms of hands, bottoms of feet, and under the  nails.  There are other types of skin cancer, too. These include Merkel cell cancer, Kaposi sarcoma, and skin (cutaneous) lymphomas, but these cancers are quite rare.  Actinic keratosis  Actinic keratosis is not skin cancer. It's a common, pre-cancer skin change that can turn into a squamous cell skin cancer over time if left untreated. Actinic keratosis lesions tend to appear on sun-exposed parts of the body. They can be pink, reddish-brown, or skin-colored. These lesions are most often small, raised, scaly, and rough, like sandpaper. In some cases, actinic keratosis lesions hurt. Most people with them have more than one lesion. Getting early treatment for actinic keratoses almost always cures the lesions.  [employer] last reviewed this educational content on 6/1/2019  © 3705-1727 [employer]. All rights reserved. This information is not intended as a substitute for professional medical care. Always follow your healthcare professional's instructions.

## 2025-06-08 NOTE — PROGRESS NOTES
Alma Waterman is a 40 year old female.    CC:    Chief Complaint   Patient presents with    Full Skin Exam     \"New Patient\" presents for FBSE.  Denies any concerns.  Denies personal or family Hx of skin cancer.         HISTORY:    Past Medical History[1]   Past Surgical History[2]   Family History[3]   Short Social Hx on File[4]     Current Medications[5]  Allergies:   Allergies[6]    Past Medical History[7]  Past Surgical History[8]  Social History     Socioeconomic History    Marital status:      Spouse name: Not on file    Number of children: Not on file    Years of education: Not on file    Highest education level: Not on file   Occupational History    Not on file   Tobacco Use    Smoking status: Never    Smokeless tobacco: Never   Vaping Use    Vaping status: Never Used   Substance and Sexual Activity    Alcohol use: Yes    Drug use: Never    Sexual activity: Yes     Birth control/protection: Vasectomy   Other Topics Concern     Service Not Asked    Blood Transfusions No    Caffeine Concern Not Asked    Occupational Exposure Not Asked    Hobby Hazards Not Asked    Sleep Concern Not Asked    Stress Concern Not Asked    Weight Concern Not Asked    Special Diet Not Asked    Back Care Not Asked    Exercise Not Asked    Bike Helmet Not Asked    Seat Belt Not Asked    Self-Exams Not Asked    Grew up on a farm Not Asked    History of tanning Yes     Comment: Was a lifegaurd    Outdoor occupation Not Asked    Breast feeding Not Asked    Reaction to local anesthetic No    Pt has a pacemaker No    Pt has a defibrillator No   Social History Narrative    Not on file     Social Drivers of Health     Food Insecurity: Not on file   Transportation Needs: Not on file   Stress: Not on file   Housing Stability: Not on file     Family History[9]    HPI:     HPI:  Chief Complaint   Patient presents with    Full Skin Exam     \"New Patient\" presents for FBSE.  Denies any concerns.  Denies personal or family Hx of skin  cancer.       History of Present Illness  Alma Waterman is a 40 year old female who presents for a dermatological evaluation of multiple moles and skin lesions.    She has never been to a dermatologist before and is seeking a comprehensive skin evaluation due to having many moles and skin lesions. Some of these moles have been present for almost ten years, including a specific mole on her cheek and another on her leg that have been stable for a significant period.    She has a history of significant sun exposure, having worked as a  for several years. She has been less diligent with sunscreen application in recent years, particularly during the COVID-19 pandemic, but is trying to improve her routine now.    There is no family history of skin cancer, although her father, who passed away from dementia complications, also had many moles.    She is concerned about a raised lesion and is interested in the possibility of removal for both practical and aesthetic reasons. No bleeding from the moles, and there are no new or rapidly changing lesions. She also mentions having many freckles and small red dots that have appeared over the years, but she does not find them concerning.    Patient presents with concerns above.    Patient has been in their usual state of health.     Past notes/ records and appropriate/relevant lab results including pathology and past body maps reviewed. Including outside notes/ PCP notes as appropriate. Updated and new information noted in current visit.     ROS:  Denies other relevant systemic complaints. See HPI.     History, medications, allergies reviewed as noted.    Allergies:  Patient has no known allergies.      There were no vitals filed for this visit.    Physical Examination:     Well-developed well-nourished patient alert oriented in no acute distress.  Exam performed of appropriate involved areas    Physical Exam  SKIN: Skin normal.    Multiple light to medium brown, well  marginated, uniformly pigmented, macules and papules 6 mm and less scattered on exam. pigmented lesions examined with dermoscopy benign-appearing patterns.     Waxy tannish keratotic papules scattered, cherry-red vascular papules scattered.    See map today's date for lesions noted .  See assessment and plan below for specific lesions.    Otherwise remarkable for lesions as noted on map.    See A/P  below for additional information:    Assessment / plan:    Results        No orders of the defined types were placed in this encounter.      Meds & Refills for this Visit:  Requested Prescriptions      No prescriptions requested or ordered in this encounter         Encounter Diagnoses   Name Primary?    Multiple nevi Yes    Benign neoplasm of skin, unspecified location     Lentigo     Skin cancer screening        Assessment & Plan  Fibroma  Fibroma on the cheek and leg, present for several years without signs of bleeding or malignancy. Fibromas are benign, pigmented growths of scar tissue that may darken with sun exposure. She is commonly removed if she causes issues such as bleeding from shaving. Removal is straightforward but may result in a scar.  - Advise to apply a Band-Aid on fibromas to prevent irritation or bleeding.  - Schedule regular dermatology appointments for monitoring.  - Consider removal of fibromas if she becomes problematic or for cosmetic reasons.  - Removal can be scheduled as a separate appointment if desired.    Recording duration: 9 minutes    Benign-appearing nevi, no atypical features on dermoscopy reassurance given monitor.     Waxy tan keratotic papules lesions in areas of concern as noted reassurance given.  Benign nature discussed.  Possibility of cryo, alphahydroxy acids over-the-counter retinol's discussed.     No other susupicious lesions on todays  exam.    Please refer to map for specific lesions.  See additional diagnoses.  Pros cons of various therapies, risks benefits  discussed.Pathophysiology, terapeutic options reviewed.  See  Medications in grid.  Instructions reviewed at length.    Benign nevi, seborrheic  keratoses, cherry angiomas:  Reassurance regarding other benign skin lesions.    Monitor for new or changing lesions. Signs and symptoms of skin cancer, ABCDE's of melanoma ( additional information available at AAD.org, skincancer.org) Encourage Sunscreen (broad-spectrum, ideally mineral-based-UVA/UVB -SPF 30 or higher) use encouraged, sun protection/sun protective clothing, self exams reviewed Followup as noted RTC ---routine checkup 6 mos -one year or p.r.n.    Encounter Times   Including precharting, reviewing chart, prior notes obtaining history: 10 minutes, medical exam :10 minutes, notes on body map, plan, counseling 10minutes My total time spent caring for the patient on the day of the encounter: 30 minutes     The patient indicates understanding of these issues and agrees to the plan.  The patient is asked to return as noted in follow-up/ above.    This note was generated using Dragon voice recognition software.  Please contact me regarding any confusion resulting from errors in recognition..  Note to patient and family: The  [employer] Act makes medical notes like these available to patients. However, be advised this is a medical document. It is intended as [id number] communication and monitoring of a patient's care needs. It is written in medical language and may contain abbreviations or verbiage that are unfamiliar. It may appear blunt or direct. Medical documents are intended to carry relevant information, facts as evident and the clinical opinion of the practitioner.       [1] History reviewed. No pertinent past medical history.  [2]   Past Surgical History:  Procedure Laterality Date          Cyst removal      Dilation/curettage,diagnostic     [3]   Family History  Problem Relation Age of Onset    Dementia Father     Lipids Mother     Breast  Cancer Neg     Uterine Cancer Neg     Ovarian Cancer Neg    [4]   Social History  Socioeconomic History    Marital status:    Tobacco Use    Smoking status: Never    Smokeless tobacco: Never   Vaping Use    Vaping status: Never Used   Substance and Sexual Activity    Alcohol use: Yes    Drug use: Never    Sexual activity: Yes     Birth control/protection: Vasectomy   Other Topics Concern    Blood Transfusions No    History of tanning Yes     Comment: Was a lifegaurd    Reaction to local anesthetic No    Pt has a pacemaker No    Pt has a defibrillator No   [5]   No current outpatient medications on file.   [6] No Known Allergies  [7] History reviewed. No pertinent past medical history.  [8]   Past Surgical History:  Procedure Laterality Date          Cyst removal      Dilation/curettage,diagnostic     [9]   Family History  Problem Relation Age of Onset    Dementia Father     Lipids Mother     Breast Cancer Neg     Uterine Cancer Neg     Ovarian Cancer Neg

## 2025-06-18 ENCOUNTER — OFFICE VISIT (OUTPATIENT)
Dept: INTERNAL MEDICINE CLINIC | Facility: CLINIC | Age: 40
End: 2025-06-18
Payer: COMMERCIAL

## 2025-06-18 VITALS
TEMPERATURE: 97 F | SYSTOLIC BLOOD PRESSURE: 132 MMHG | WEIGHT: 130 LBS | HEIGHT: 62 IN | DIASTOLIC BLOOD PRESSURE: 76 MMHG | BODY MASS INDEX: 23.92 KG/M2 | OXYGEN SATURATION: 100 % | HEART RATE: 62 BPM

## 2025-06-18 DIAGNOSIS — E55.9 VITAMIN D DEFICIENCY: ICD-10-CM

## 2025-06-18 DIAGNOSIS — Z00.00 PHYSICAL EXAM, ANNUAL: Primary | ICD-10-CM

## 2025-06-18 DIAGNOSIS — Z86.32 HISTORY OF GESTATIONAL DIABETES: ICD-10-CM

## 2025-06-18 RX ORDER — ONDANSETRON 4 MG/1
TABLET, ORALLY DISINTEGRATING ORAL
COMMUNITY
Start: 2025-01-17 | End: 2025-06-18 | Stop reason: ALTCHOICE

## 2025-06-18 NOTE — PROGRESS NOTES
Alma Waterman is a 40 year old female.  Chief Complaint   Patient presents with    Establish Care    Physical       HPI:   New pt   C/c establish care -   C/o annual physical        PMH  Gestational diabetes     Sees Dr. Wang dermatology for whole body skin check    Lives with  and 3 kids -boy girl boy , works      Current Medications[1]   Past Medical History[2]   Past Surgical History[3]   Social History:  Short Social Hx on File[4]     REVIEW OF SYSTEMS:   GENERAL HEALTH: No fevers, chills, sweats, fatigue  VISION: No recent vision problems, blurry vision or double vision  HEENT: No decreased hearing ear pain nasal congestion or sore throat  SKIN: denies any unusual skin lesions or rashes  RESPIRATORY: denies shortness of breath, cough, wheezing  CARDIOVASCULAR: denies chest pain on exertion, palpitations, swelling in feet  GI: denies abdominal pain and denies heartburn, nausea or vomiting  : No Pain on urination, change in the color of urine, discharge, urinating frequently  MUS: No back pain, joint pain, muscle pain  NEURO: denies headaches , anxiety, depression    EXAM:   /76   Pulse 62   Temp 97.2 °F (36.2 °C)   Ht 5' 2\" (1.575 m)   Wt 130 lb (59 kg)   LMP 05/02/2025   SpO2 100%   BMI 23.78 kg/m²   GENERAL: well developed, well nourished,in no apparent distress  SKIN: no rashes,no suspicious lesions  HEENT: atraumatic, normocephalic,ears and throat are clear, no frontal or maxillary sinus tenderness, pupils equal reactive to light bilaterally, extraocular muscles intact  NECK: supple,no adenopathy, nontender   LUNGS: clear to auscultation, no wheeze  CARDIO: RRR without murmur  GI: good BS's,no masses or tenderness  EXTREMITIES: no cyanosis, or edema    ASSESSMENT AND PLAN:   Diagnoses and all orders for this visit:    Physical exam, annual  -     Comp Metabolic Panel (14); Future  -     Hemoglobin A1C; Future  -     Lipid Panel; Future  -     TSH W Reflex To Free T4;  Future  -     Vitamin D; Future  -     CBC, Platelet; No Differential; Future    History of gestational diabetes  -     Hemoglobin A1C; Future    Vitamin D deficiency  -     Vitamin D; Future    Advised patient to watch what she eats and  exercise, seatbelt use no texting driving, sunscreens advised        Preventive medicine  Pap smear 2025 Dr. Fields   Mammogram 2025  Labs ordered to be done once fasting    The patient indicates understanding of these issues and agrees to the plan.  No follow-ups on file.       [1]   No current outpatient medications on file.   [2] History reviewed. No pertinent past medical history.  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

## 2025-06-22 ENCOUNTER — LAB ENCOUNTER (OUTPATIENT)
Dept: LAB | Facility: HOSPITAL | Age: 40
End: 2025-06-22
Attending: INTERNAL MEDICINE
Payer: COMMERCIAL

## 2025-06-22 DIAGNOSIS — E55.9 VITAMIN D DEFICIENCY: ICD-10-CM

## 2025-06-22 DIAGNOSIS — Z86.32 HISTORY OF GESTATIONAL DIABETES: ICD-10-CM

## 2025-06-22 DIAGNOSIS — Z00.00 PHYSICAL EXAM, ANNUAL: ICD-10-CM

## 2025-06-22 LAB
ALBUMIN SERPL-MCNC: 4.8 G/DL (ref 3.2–4.8)
ALBUMIN/GLOB SERPL: 2.1 {RATIO} (ref 1–2)
ALP LIVER SERPL-CCNC: 38 U/L (ref 37–98)
ALT SERPL-CCNC: 15 U/L (ref 10–49)
ANION GAP SERPL CALC-SCNC: 6 MMOL/L (ref 0–18)
AST SERPL-CCNC: 17 U/L (ref ?–34)
BILIRUB SERPL-MCNC: 0.9 MG/DL (ref 0.3–1.2)
BUN BLD-MCNC: 16 MG/DL (ref 9–23)
BUN/CREAT SERPL: 17.8 (ref 10–20)
CALCIUM BLD-MCNC: 9.3 MG/DL (ref 8.7–10.4)
CHLORIDE SERPL-SCNC: 105 MMOL/L (ref 98–112)
CHOLEST SERPL-MCNC: 176 MG/DL (ref ?–200)
CO2 SERPL-SCNC: 28 MMOL/L (ref 21–32)
CREAT BLD-MCNC: 0.9 MG/DL (ref 0.55–1.02)
DEPRECATED RDW RBC AUTO: 43.2 FL (ref 35.1–46.3)
EGFRCR SERPLBLD CKD-EPI 2021: 82 ML/MIN/1.73M2 (ref 60–?)
ERYTHROCYTE [DISTWIDTH] IN BLOOD BY AUTOMATED COUNT: 12.4 % (ref 11–15)
EST. AVERAGE GLUCOSE BLD GHB EST-MCNC: 105 MG/DL (ref 68–126)
FASTING PATIENT LIPID ANSWER: YES
FASTING STATUS PATIENT QL REPORTED: YES
GLOBULIN PLAS-MCNC: 2.3 G/DL (ref 2–3.5)
GLUCOSE BLD-MCNC: 82 MG/DL (ref 70–99)
HBA1C MFR BLD: 5.3 % (ref ?–5.7)
HCT VFR BLD AUTO: 39.7 % (ref 35–48)
HDLC SERPL-MCNC: 71 MG/DL (ref 40–59)
HGB BLD-MCNC: 12.9 G/DL (ref 12–16)
LDLC SERPL CALC-MCNC: 91 MG/DL (ref ?–100)
MCH RBC QN AUTO: 30.4 PG (ref 26–34)
MCHC RBC AUTO-ENTMCNC: 32.5 G/DL (ref 31–37)
MCV RBC AUTO: 93.6 FL (ref 80–100)
NONHDLC SERPL-MCNC: 105 MG/DL (ref ?–130)
OSMOLALITY SERPL CALC.SUM OF ELEC: 288 MOSM/KG (ref 275–295)
PLATELET # BLD AUTO: 300 10(3)UL (ref 150–450)
POTASSIUM SERPL-SCNC: 4.3 MMOL/L (ref 3.5–5.1)
PROT SERPL-MCNC: 7.1 G/DL (ref 5.7–8.2)
RBC # BLD AUTO: 4.24 X10(6)UL (ref 3.8–5.3)
SODIUM SERPL-SCNC: 139 MMOL/L (ref 136–145)
TRIGL SERPL-MCNC: 73 MG/DL (ref 30–149)
TSI SER-ACNC: 3.26 UIU/ML (ref 0.55–4.78)
VIT D+METAB SERPL-MCNC: 41.2 NG/ML (ref 30–100)
VLDLC SERPL CALC-MCNC: 12 MG/DL (ref 0–30)
WBC # BLD AUTO: 5.8 X10(3) UL (ref 4–11)

## 2025-06-22 PROCEDURE — 36415 COLL VENOUS BLD VENIPUNCTURE: CPT

## 2025-06-22 PROCEDURE — 82306 VITAMIN D 25 HYDROXY: CPT

## 2025-06-22 PROCEDURE — 80053 COMPREHEN METABOLIC PANEL: CPT

## 2025-06-22 PROCEDURE — 85027 COMPLETE CBC AUTOMATED: CPT

## 2025-06-22 PROCEDURE — 84443 ASSAY THYROID STIM HORMONE: CPT

## 2025-06-22 PROCEDURE — 80061 LIPID PANEL: CPT

## 2025-06-22 PROCEDURE — 83036 HEMOGLOBIN GLYCOSYLATED A1C: CPT

## (undated) DEVICE — Device

## (undated) DEVICE — LAPAROSCOPY: Brand: MEDLINE INDUSTRIES, INC.

## (undated) DEVICE — [HIGH FLOW INSUFFLATOR,  DO NOT USE IF PACKAGE IS DAMAGED,  KEEP DRY,  KEEP AWAY FROM SUNLIGHT,  PROTECT FROM HEAT AND RADIOACTIVE SOURCES.]: Brand: PNEUMOSURE

## (undated) DEVICE — EXOFIN TISSUE ADHESIVE 1.0ML

## (undated) DEVICE — TROCAR: Brand: KII FIOS FIRST ENTRY

## (undated) DEVICE — SUTURE VICRYL 0 UR-6

## (undated) DEVICE — TROCAR: Brand: KII SLEEVE

## (undated) DEVICE — SOL  .9 3000ML

## (undated) DEVICE — SPCMN DTCHBLE POUCH 5X7 500ML

## (undated) DEVICE — MANIPULATOR CATH ESCP KRNR

## (undated) DEVICE — DISPOSABLE SUCTION/IRRIGATOR TUBE SET: Brand: AHTO

## (undated) DEVICE — UNDYED BRAIDED (POLYGLACTIN 910), SYNTHETIC ABSORBABLE SUTURE: Brand: COATED VICRYL

## (undated) DEVICE — SOL  .9 1000ML BTL